# Patient Record
Sex: FEMALE | Race: OTHER | Employment: OTHER | URBAN - METROPOLITAN AREA
[De-identification: names, ages, dates, MRNs, and addresses within clinical notes are randomized per-mention and may not be internally consistent; named-entity substitution may affect disease eponyms.]

---

## 2017-11-01 NOTE — PROCEDURE NOTE: CLINICAL
PROCEDURE NOTE: Biopsy of Eyelid Right Lower Lid. Diagnosis: Eyelid Lesion, Uncertain Behavior. Prior to treatment, the risks/benefits/alternatives were discussed. The patient wished to proceed with procedure. The area of the surgical site was prepped surgical scrub. 0.5 cc of 2% lidocaine with epinephrine was injected beneath the lesion. The lesion was excised with Zohra scissors. The defect was cauterized. Erythromycin ointment was placed on the biopsy site. Patient tolerated procedure well. There were no complications. The lesion was placed in formalin and sent to a pathology lab. Post procedure instructions given. Patricia Lau

## 2018-07-25 ENCOUNTER — NEW PATIENT COMPREHENSIVE (OUTPATIENT)
Dept: URBAN - METROPOLITAN AREA CLINIC 43 | Facility: CLINIC | Age: 43
End: 2018-07-25

## 2018-07-25 DIAGNOSIS — H52.13: ICD-10-CM

## 2018-07-25 DIAGNOSIS — H52.4: ICD-10-CM

## 2018-07-25 PROCEDURE — 92310-1 LEVEL 1 CONTACT LENS MANAGEMENT

## 2018-07-25 PROCEDURE — 92015 DETERMINE REFRACTIVE STATE: CPT

## 2018-07-25 PROCEDURE — 92004 COMPRE OPH EXAM NEW PT 1/>: CPT

## 2018-07-25 ASSESSMENT — VISUAL ACUITY
OD_SC: 20/400
OD_CC: 20/30-2
OS_SC: 20/400
OS_CC: J1
OS_SC: J12
OD_CC: J2
OD_SC: J14
OS_CC: 20/20

## 2018-07-25 ASSESSMENT — TONOMETRY
OS_IOP_MMHG: 15
OD_IOP_MMHG: 15

## 2018-08-08 ENCOUNTER — CONTACT LENS FOLLOW UP (OUTPATIENT)
Dept: URBAN - METROPOLITAN AREA CLINIC 43 | Facility: CLINIC | Age: 43
End: 2018-08-08

## 2018-08-08 DIAGNOSIS — Z46.0: ICD-10-CM

## 2018-08-08 PROCEDURE — 92310F

## 2020-02-03 ENCOUNTER — CATARACT CONSULT (OUTPATIENT)
Dept: URBAN - METROPOLITAN AREA CLINIC 43 | Facility: CLINIC | Age: 45
End: 2020-02-03

## 2020-02-03 DIAGNOSIS — H25.811: ICD-10-CM

## 2020-02-03 DIAGNOSIS — H25.812: ICD-10-CM

## 2020-02-03 DIAGNOSIS — H04.123: ICD-10-CM

## 2020-02-03 PROCEDURE — 92025-2 CORNEAL TOPOGRAPHY, PT

## 2020-02-03 PROCEDURE — V2799PMN IMPRIMIS PRED-MOXI-NEPAF 5ML

## 2020-02-03 PROCEDURE — 92015 DETERMINE REFRACTIVE STATE: CPT

## 2020-02-03 PROCEDURE — 92136TC INTERFEROMETRY - TECHNICAL COMPONENT

## 2020-02-03 PROCEDURE — 99214 OFFICE O/P EST MOD 30 MIN: CPT

## 2020-02-03 ASSESSMENT — VISUAL ACUITY
OS_CC: J2
OS_SC: CF 4FT
OS_SC: J1
OD_BAT: 20/60
OS_CC: 20/30-2
OD_CC: 20/40+2
OD_CC: J3
OD_SC: CF 4FT
OS_BAT: 20/60
OD_SC: J1

## 2020-02-03 ASSESSMENT — TONOMETRY
OS_IOP_MMHG: 17
OD_IOP_MMHG: 17

## 2020-02-20 ENCOUNTER — ESTABLISHED PATIENT (OUTPATIENT)
Dept: URBAN - METROPOLITAN AREA SURGERY 14 | Facility: SURGERY | Age: 45
End: 2020-02-20

## 2020-02-20 ENCOUNTER — SURGERY/PROCEDURE (OUTPATIENT)
Dept: URBAN - METROPOLITAN AREA CLINIC 43 | Facility: CLINIC | Age: 45
End: 2020-02-20

## 2020-02-20 DIAGNOSIS — H25.812: ICD-10-CM

## 2020-02-20 DIAGNOSIS — H52.13: ICD-10-CM

## 2020-02-20 DIAGNOSIS — H25.811: ICD-10-CM

## 2020-02-20 DIAGNOSIS — H52.203: ICD-10-CM

## 2020-02-20 PROCEDURE — 66999LNSR LENSAR LASER FOR CAT SX

## 2020-02-20 PROCEDURE — 99211HP H&P OFFICE/OUTPATIENT VISIT, EST

## 2020-02-20 PROCEDURE — 65772LRI LRI DURING CAT SX

## 2020-02-20 PROCEDURE — 66984AV REMOVE CATARACT, INSERT ADVANCED LENS

## 2020-02-21 ENCOUNTER — 1 DAY POST-OP (OUTPATIENT)
Dept: URBAN - METROPOLITAN AREA CLINIC 43 | Facility: CLINIC | Age: 45
End: 2020-02-21

## 2020-02-21 DIAGNOSIS — Z96.1: ICD-10-CM

## 2020-02-21 PROCEDURE — 99024 POSTOP FOLLOW-UP VISIT: CPT

## 2020-02-21 ASSESSMENT — VISUAL ACUITY
OD_PH: 20/25-3
OD_SC: 20/80-1
OS_SC: 20/40-1
OD_SC: J2
OS_SC: J3
OS_PH: 20/25-3

## 2020-02-21 ASSESSMENT — TONOMETRY: OS_IOP_MMHG: 15

## 2020-02-25 ENCOUNTER — POST OP/EVAL OF SECOND EYE (OUTPATIENT)
Dept: URBAN - METROPOLITAN AREA CLINIC 43 | Facility: CLINIC | Age: 45
End: 2020-02-25

## 2020-02-25 DIAGNOSIS — H04.123: ICD-10-CM

## 2020-02-25 DIAGNOSIS — Z96.1: ICD-10-CM

## 2020-02-25 DIAGNOSIS — H25.811: ICD-10-CM

## 2020-02-25 PROCEDURE — 99024 POSTOP FOLLOW-UP VISIT: CPT

## 2020-02-25 PROCEDURE — 99213 OFFICE O/P EST LOW 20 MIN: CPT

## 2020-02-25 ASSESSMENT — VISUAL ACUITY
OD_CC: J3
OD_CC: 20/25-2
OS_SC: J1
OS_SC: 20/20-2
OD_BAT: 20/60

## 2020-02-25 ASSESSMENT — TONOMETRY: OS_IOP_MMHG: 16

## 2020-02-27 ENCOUNTER — SURGERY/PROCEDURE (OUTPATIENT)
Dept: URBAN - METROPOLITAN AREA CLINIC 43 | Facility: CLINIC | Age: 45
End: 2020-02-27

## 2020-02-27 ENCOUNTER — FOLLOW UP (OUTPATIENT)
Dept: URBAN - METROPOLITAN AREA CLINIC 39 | Facility: CLINIC | Age: 45
End: 2020-02-27

## 2020-02-27 ENCOUNTER — ESTABLISHED PATIENT (OUTPATIENT)
Dept: URBAN - METROPOLITAN AREA SURGERY 14 | Facility: SURGERY | Age: 45
End: 2020-02-27

## 2020-02-27 DIAGNOSIS — Z96.1: ICD-10-CM

## 2020-02-27 DIAGNOSIS — H25.811: ICD-10-CM

## 2020-02-27 PROCEDURE — 66999LNSR LENSAR LASER FOR CAT SX

## 2020-02-27 PROCEDURE — 99211HP H&P OFFICE/OUTPATIENT VISIT, EST

## 2020-02-27 PROCEDURE — 99211T TECH SERVICE

## 2020-02-27 PROCEDURE — 66984AV REMOVE CATARACT, INSERT ADVANCED LENS

## 2020-02-27 ASSESSMENT — VISUAL ACUITY
OS_SC: 20/20-2
OD_SC: 20/400
OS_SC: J1
OD_SC: J10

## 2020-02-28 ENCOUNTER — 1 DAY POST-OP (OUTPATIENT)
Dept: URBAN - METROPOLITAN AREA CLINIC 43 | Facility: CLINIC | Age: 45
End: 2020-02-28

## 2020-02-28 DIAGNOSIS — Z96.1: ICD-10-CM

## 2020-02-28 PROCEDURE — 99024 POSTOP FOLLOW-UP VISIT: CPT

## 2020-02-28 ASSESSMENT — VISUAL ACUITY
OD_SC: 20/30+2
OS_SC: J1
OS_SC: 20/20-2
OD_SC: J8-

## 2020-02-28 ASSESSMENT — TONOMETRY
OS_IOP_MMHG: 17
OD_IOP_MMHG: 21

## 2020-03-13 ENCOUNTER — POST-OP CATARACT (OUTPATIENT)
Dept: URBAN - METROPOLITAN AREA CLINIC 43 | Facility: CLINIC | Age: 45
End: 2020-03-13

## 2020-03-13 DIAGNOSIS — Z96.1: ICD-10-CM

## 2020-03-13 PROCEDURE — 99024 POSTOP FOLLOW-UP VISIT: CPT

## 2020-03-13 ASSESSMENT — VISUAL ACUITY
OS_SC: 20/20-1
OD_SC: 20/30+2
OD_SC: J1
OS_SC: J1

## 2020-03-13 ASSESSMENT — TONOMETRY: OD_IOP_MMHG: 20

## 2020-08-14 ENCOUNTER — ESTABLISHED COMPREHENSIVE EXAM (OUTPATIENT)
Dept: URBAN - METROPOLITAN AREA CLINIC 43 | Facility: CLINIC | Age: 45
End: 2020-08-14

## 2020-08-14 DIAGNOSIS — Z01.00: ICD-10-CM

## 2020-08-14 DIAGNOSIS — H52.4: ICD-10-CM

## 2020-08-14 PROCEDURE — 92499OP2 OPTOMAP RETINAL SCREENING BOTH EYES

## 2020-08-14 PROCEDURE — 92014 COMPRE OPH EXAM EST PT 1/>: CPT

## 2020-08-14 PROCEDURE — 92015 DETERMINE REFRACTIVE STATE: CPT

## 2020-08-14 ASSESSMENT — TONOMETRY
OD_IOP_MMHG: 20
OS_IOP_MMHG: 20

## 2020-08-14 ASSESSMENT — VISUAL ACUITY
OD_SC: J1
OD_SC: 20/30-1
OS_SC: 20/30
OS_SC: J1

## 2021-01-06 ENCOUNTER — ESTABLISHED COMPREHENSIVE EXAM (OUTPATIENT)
Dept: URBAN - METROPOLITAN AREA CLINIC 43 | Facility: CLINIC | Age: 46
End: 2021-01-06

## 2021-01-06 DIAGNOSIS — H52.4: ICD-10-CM

## 2021-01-06 PROCEDURE — 92014 COMPRE OPH EXAM EST PT 1/>: CPT

## 2021-01-06 PROCEDURE — 92015 DETERMINE REFRACTIVE STATE: CPT

## 2021-01-06 ASSESSMENT — VISUAL ACUITY
OD_SC: 20/30+2
OS_SC: 20/30+2
OS_SC: J1
OD_SC: J2

## 2021-01-06 ASSESSMENT — TONOMETRY
OD_IOP_MMHG: 17
OS_IOP_MMHG: 15

## 2021-01-14 ENCOUNTER — SURGERY/PROCEDURE (OUTPATIENT)
Dept: URBAN - METROPOLITAN AREA SURGERY 14 | Facility: SURGERY | Age: 46
End: 2021-01-14

## 2021-01-14 ENCOUNTER — ESTABLISHED PATIENT (OUTPATIENT)
Dept: URBAN - METROPOLITAN AREA CLINIC 39 | Facility: CLINIC | Age: 46
End: 2021-01-14

## 2021-01-14 DIAGNOSIS — H26.493: ICD-10-CM

## 2021-01-14 DIAGNOSIS — H04.123: ICD-10-CM

## 2021-01-14 PROCEDURE — 6682150 YAG CAPSULOTOMY

## 2021-01-14 PROCEDURE — 92014 COMPRE OPH EXAM EST PT 1/>: CPT

## 2021-01-14 ASSESSMENT — TONOMETRY
OS_IOP_MMHG: 18
OD_IOP_MMHG: 18

## 2021-01-14 ASSESSMENT — VISUAL ACUITY
OD_SC: 20/25
OS_BAT: 20/60
OD_BAT: 20/70
OS_SC: 20/25-1

## 2021-01-22 ENCOUNTER — YAG POST-OP (OUTPATIENT)
Dept: URBAN - METROPOLITAN AREA CLINIC 43 | Facility: CLINIC | Age: 46
End: 2021-01-22

## 2021-01-22 DIAGNOSIS — Z98.890: ICD-10-CM

## 2021-01-22 PROCEDURE — 99024 POSTOP FOLLOW-UP VISIT: CPT

## 2021-01-22 ASSESSMENT — VISUAL ACUITY
OS_SC: 20/25-2
OS_SC: J1
OD_SC: 20/25-2
OD_SC: J1

## 2021-01-22 ASSESSMENT — TONOMETRY
OD_IOP_MMHG: 16
OS_IOP_MMHG: 16

## 2023-07-19 ENCOUNTER — EMERGENCY VISIT (OUTPATIENT)
Dept: URBAN - METROPOLITAN AREA CLINIC 43 | Facility: CLINIC | Age: 48
End: 2023-07-19

## 2023-07-19 DIAGNOSIS — H00.012: ICD-10-CM

## 2023-07-19 DIAGNOSIS — H04.123: ICD-10-CM

## 2023-07-19 PROCEDURE — 99211 OFF/OP EST MAY X REQ PHY/QHP: CPT

## 2023-07-19 ASSESSMENT — TONOMETRY
OD_IOP_MMHG: 14
OS_IOP_MMHG: 16

## 2023-07-19 ASSESSMENT — VISUAL ACUITY
OS_SC: J1
OU_SC: J1
OU_SC: 20/20
OD_SC: 20/25-2
OD_SC: J1
OS_SC: 20/20

## 2024-06-07 ENCOUNTER — COMPREHENSIVE EXAM (OUTPATIENT)
Dept: URBAN - METROPOLITAN AREA CLINIC 43 | Facility: CLINIC | Age: 49
End: 2024-06-07

## 2024-06-07 DIAGNOSIS — Z01.00: ICD-10-CM

## 2024-06-07 DIAGNOSIS — H52.4: ICD-10-CM

## 2024-06-07 PROCEDURE — 92015 DETERMINE REFRACTIVE STATE: CPT

## 2024-06-07 PROCEDURE — 92014 COMPRE OPH EXAM EST PT 1/>: CPT

## 2024-06-07 ASSESSMENT — TONOMETRY
OS_IOP_MMHG: 19
OD_IOP_MMHG: 18

## 2024-06-07 ASSESSMENT — VISUAL ACUITY
OD_PH: 20/20-1
OD_SC: 20/30
OS_SC: J1
OS_PH: 20/20
OS_SC: 20/25+2
OD_SC: J1